# Patient Record
Sex: FEMALE
[De-identification: names, ages, dates, MRNs, and addresses within clinical notes are randomized per-mention and may not be internally consistent; named-entity substitution may affect disease eponyms.]

---

## 2022-12-16 PROBLEM — Z00.00 ENCOUNTER FOR PREVENTIVE HEALTH EXAMINATION: Status: ACTIVE | Noted: 2022-12-16

## 2023-03-01 ENCOUNTER — APPOINTMENT (OUTPATIENT)
Dept: UROLOGY | Facility: CLINIC | Age: 38
End: 2023-03-01
Payer: COMMERCIAL

## 2023-03-01 VITALS
TEMPERATURE: 97.2 F | DIASTOLIC BLOOD PRESSURE: 80 MMHG | RESPIRATION RATE: 17 BRPM | SYSTOLIC BLOOD PRESSURE: 126 MMHG | OXYGEN SATURATION: 99 % | BODY MASS INDEX: 22.15 KG/M2 | HEIGHT: 63 IN | WEIGHT: 125 LBS | HEART RATE: 65 BPM

## 2023-03-01 DIAGNOSIS — R32 UNSPECIFIED URINARY INCONTINENCE: ICD-10-CM

## 2023-03-01 PROCEDURE — 99204 OFFICE O/P NEW MOD 45 MIN: CPT

## 2023-03-01 RX ORDER — NITROFURANTOIN (MONOHYDRATE/MACROCRYSTALS) 25; 75 MG/1; MG/1
100 CAPSULE ORAL
Qty: 10 | Refills: 0 | Status: ACTIVE | COMMUNITY
Start: 2023-03-01 | End: 1900-01-01

## 2023-03-02 NOTE — PHYSICAL EXAM

## 2023-03-02 NOTE — HISTORY OF PRESENT ILLNESS
[FreeTextEntry1] : Pt is a  female with PMH of PFD.  She is a pt last seen by me in 2018.  She has been under the care of Dr. Alfred Rivera for the past several years.  Dr. Rivera has offered her botox for her frequency/urgency symptoms and she presents for a second opinion. \par \par Pt was recently sexually active and has since experienced a "flare in her symptoms".  Udip today pos for nitrites. \par \par PMH: above\par PSH:   x 2\par FH:  non-contributory \par SH: no tob, no EtOH\par

## 2023-03-02 NOTE — ASSESSMENT
[FreeTextEntry1] : 37 year old with long-standing PFD.  She may indeed benefit from intravesical Botox and I have asked she have her records sent for my review, including her recent UDS.   \par \par The patient and I discussed what the pelvic floor is and what pelvic floor dysfunction is. \par \par I explained that the pelvic floor is a group of muscles that links the "front" of your core (abs, obliques) to the "back" of your core (lats, erectors). It is a group of muscles that most people have poor awareness of. It includes (anteriorly) the superficial and deep transverse perineals, the ischiocavernosus, and bulbospongiosus. Posteriorly it includes the levators, pubococcygeus, iliococcygeus, coccygeus, and levators.\par \par Because the urethra, vagina and rectum traverse these muscles, non-relaxation of the pelvic floor can cause vaginal, urinary and bowel issues. Additionally the bladder sits on top of the pelvic floor and can be affected by PF abnormalities. And because these muscles also have bony attachments, PFD can cause lower back pelvic, suprapubic and hip pain.\par \par While many patients have PFD for unclear reasons, some patients have a h/o horseback riding or sexual abuse/assault. In many patients, the condition of PFD may precede symptoms by quite some time.\par \par Treatment for PFD is multimodal. It almost always requires physical therapy and biofeedback. Intravaginal relaxing agents and trigger point injections as well as stress reduction and bowel regimens can be used as adjuncts. Treatment may take quite some time and requires patience: the patient must first learn the muscles affected and then work on relaxing them. There are multiple specialists that can be involved in this treatment.\par \par She also may have a current UTI and I sent today's urine for culture. \par \par We will be in touch with her culture results. \par \par

## 2023-03-07 ENCOUNTER — NON-APPOINTMENT (OUTPATIENT)
Age: 38
End: 2023-03-07

## 2023-03-08 LAB
BACTERIA UR CULT: ABNORMAL
MYCOPLASMA HOMINIS CULTURE: NEGATIVE
UREAPLASMA CULTURE: NEGATIVE

## 2023-03-16 DIAGNOSIS — R30.0 DYSURIA: ICD-10-CM

## 2023-03-16 RX ORDER — SULFAMETHOXAZOLE AND TRIMETHOPRIM 800; 160 MG/1; MG/1
800-160 TABLET ORAL
Qty: 10 | Refills: 0 | Status: ACTIVE | COMMUNITY
Start: 2023-03-16 | End: 1900-01-01

## 2023-03-20 ENCOUNTER — NON-APPOINTMENT (OUTPATIENT)
Age: 38
End: 2023-03-20

## 2023-03-25 ENCOUNTER — TRANSCRIPTION ENCOUNTER (OUTPATIENT)
Age: 38
End: 2023-03-25

## 2023-03-25 RX ORDER — CIPROFLOXACIN HYDROCHLORIDE 500 MG/1
500 TABLET, FILM COATED ORAL
Qty: 6 | Refills: 0 | Status: ACTIVE | COMMUNITY
Start: 2023-03-20 | End: 1900-01-01

## 2023-03-31 LAB
BACTERIA UR CULT: ABNORMAL
BACTERIA UR CULT: NORMAL
MYCOPLASMA HOMINIS CULTURE: NEGATIVE
UREAPLASMA CULTURE: NEGATIVE

## 2023-04-06 ENCOUNTER — APPOINTMENT (OUTPATIENT)
Dept: UROLOGY | Facility: CLINIC | Age: 38
End: 2023-04-06
Payer: COMMERCIAL

## 2023-04-06 PROCEDURE — 99213 OFFICE O/P EST LOW 20 MIN: CPT | Mod: 95

## 2023-04-07 RX ORDER — PHENAZOPYRIDINE HYDROCHLORIDE 200 MG/1
200 TABLET ORAL
Qty: 30 | Refills: 0 | Status: ACTIVE | COMMUNITY
Start: 2023-04-06 | End: 1900-01-01

## 2023-04-27 NOTE — ASSESSMENT
[FreeTextEntry1] : I discussed the findings and options with Ms. LAISHA HICKS in detail. Pt is a  female with PMH of PFD.\par \par I reviewed the generic recommendations for UTI prevention with Ms. HICKS in detail, including: wiping front to back, increasing fluid intake. She will incorporate these and understands that she should have a urine culture prior to being treated with any courses of antibiotics.\par \par Ms. Hicks has done well with Pyridium in the past and I am renewing her medication for 1 month. Additionally, she was encouraged to do medication log in order to determine what medical therapy works for her.\par \par Options for management of the patient's overactive bladder and incontinence discussed at length. These included medical therapy, behavioral modification, bladder retraining, and surgical options. Surgical options for third line therapies reviewed included injection of botulinum toxin versus sacral nerve stimulation therapy.\par \par We've had a lengthy discussion about botox being the plausible next step to address her urge frequency. \par \par Ms. Hicks was advised to see primary urologist Dr. Alfred Rivera as she plans to undergo intravesical Botox with him.  I discussed the UDS findings with Dr. Rivera and am in agreement. \par \par Patient expressed understanding.\par

## 2023-04-27 NOTE — ADDENDUM
[FreeTextEntry1] : A portion of this note was written by [Fredo Jacques] on 04/13/2023 acting as a scribe for Dr. Lagos. \par \par I have personally reviewed the chart and agree that the record accurately reflects my personal performance of the history, physical exam, assessment, and plan.\par

## 2023-04-27 NOTE — PHYSICAL EXAM

## 2023-04-27 NOTE — HISTORY OF PRESENT ILLNESS
[Other Location: e.g. School (Enter Location, City,State)___] : at [unfilled], at the time of the visit. [Medical Office: ([facility])___] : at the medical office located in  [Verbal consent obtained from patient] : the patient, [unfilled] [FreeTextEntry1] : Ms. LAISHA HICKS comes in today for her Urologic follow up. Pt is a  female with PMH of PFD. Ms. Hicks returns via virtual visit today with complaints of waxing and waning UTI-like symptoms. Pt still reports dyspareunia.\par \par 2023: Pt is a  female with PMH of PFD.  She is a pt last seen by me in 2018.  She has been under the care of Dr. Alfred Rivera for the past several years.  Dr. Rivera has offered her botox for her frequency/urgency symptoms and she presents for a second opinion. \par \par Pt was recently sexually active and has since experienced a "flare in her symptoms".  Udip today pos for nitrites. \par \par PMH: above\par PSH:   x 2\par FH:  non-contributory \par SH: no tob, no EtOH\par